# Patient Record
Sex: MALE | Race: OTHER | HISPANIC OR LATINO
[De-identification: names, ages, dates, MRNs, and addresses within clinical notes are randomized per-mention and may not be internally consistent; named-entity substitution may affect disease eponyms.]

---

## 2021-01-20 PROBLEM — Z00.00 ENCOUNTER FOR PREVENTIVE HEALTH EXAMINATION: Status: ACTIVE | Noted: 2021-01-20

## 2021-01-21 ENCOUNTER — APPOINTMENT (OUTPATIENT)
Dept: SURGERY | Facility: CLINIC | Age: 51
End: 2021-01-21
Payer: MEDICAID

## 2021-01-21 VITALS
WEIGHT: 206 LBS | HEART RATE: 72 BPM | SYSTOLIC BLOOD PRESSURE: 134 MMHG | BODY MASS INDEX: 31.22 KG/M2 | HEIGHT: 68 IN | DIASTOLIC BLOOD PRESSURE: 84 MMHG | TEMPERATURE: 98.1 F

## 2021-01-21 DIAGNOSIS — Z78.9 OTHER SPECIFIED HEALTH STATUS: ICD-10-CM

## 2021-01-21 DIAGNOSIS — M25.519 PAIN IN UNSPECIFIED SHOULDER: ICD-10-CM

## 2021-01-21 DIAGNOSIS — Z83.3 FAMILY HISTORY OF DIABETES MELLITUS: ICD-10-CM

## 2021-01-21 DIAGNOSIS — I10 ESSENTIAL (PRIMARY) HYPERTENSION: ICD-10-CM

## 2021-01-21 DIAGNOSIS — F32.9 MAJOR DEPRESSIVE DISORDER, SINGLE EPISODE, UNSPECIFIED: ICD-10-CM

## 2021-01-21 DIAGNOSIS — Z56.0 UNEMPLOYMENT, UNSPECIFIED: ICD-10-CM

## 2021-01-21 PROCEDURE — 99203 OFFICE O/P NEW LOW 30 MIN: CPT

## 2021-01-21 PROCEDURE — 99072 ADDL SUPL MATRL&STAF TM PHE: CPT

## 2021-01-21 RX ORDER — SERTRALINE HYDROCHLORIDE 100 MG/1
100 TABLET, FILM COATED ORAL
Refills: 0 | Status: ACTIVE | COMMUNITY

## 2021-01-21 RX ORDER — NEBIVOLOL HYDROCHLORIDE 10 MG/1
10 TABLET ORAL
Refills: 0 | Status: ACTIVE | COMMUNITY

## 2021-01-21 RX ORDER — LOSARTAN POTASSIUM 100 MG/1
TABLET, FILM COATED ORAL
Refills: 0 | Status: ACTIVE | COMMUNITY

## 2021-01-21 RX ORDER — NAPROXEN 500 MG/1
500 TABLET ORAL
Refills: 0 | Status: ACTIVE | COMMUNITY

## 2021-01-21 SDOH — ECONOMIC STABILITY - INCOME SECURITY: UNEMPLOYMENT, UNSPECIFIED: Z56.0

## 2021-01-21 NOTE — PLAN
[FreeTextEntry1] : Mr. AZEVEDO  was told significance of findings, options, risks and benefits were explained.  Informed consent for laparoscopic/possible open  incisional hernia repair and potential risks, benefits and alternatives (surgical options were discussed including non-surgical options or the option of no surgery) to the planned surgery were discussed in depth.  All surgical options were discussed including non-surgical treatments.  He wishes to proceed with surgery.  We will plan for surgery on at the next available date, pending any required insurance pre-certification or pre-approval. He agrees to obtain any necessary pre-operative evaluations and testing prior to surgery.\par Patient advised to seek immediate medical attention with any acute change in symptoms or with the development of any new or worsening symptoms.  Patient's questions and concerns addressed to patient's satisfaction, and patient verbalized an understanding of the information discussed.\par \par

## 2021-01-21 NOTE — CONSULT LETTER
[Dear  ___] : Dear  [unfilled], [Consult Letter:] : I had the pleasure of evaluating your patient, [unfilled]. [Please see my note below.] : Please see my note below. [Consult Closing:] : Thank you very much for allowing me to participate in the care of this patient.  If you have any questions, please do not hesitate to contact me. [Sincerely,] : Sincerely, [FreeTextEntry3] : Sal Desai MD, FACS

## 2021-01-21 NOTE — PHYSICAL EXAM
[Alert] : alert [Oriented to Person] : oriented to person [Oriented to Place] : oriented to place [Oriented to Time] : oriented to time [Calm] : calm [de-identified] : He  is alert, well-groomed, and cheerful.\par   [de-identified] : anicteric.  Nasal mucosa pink, septum midline. Oral mucosa pink.  Tongue midline, Pharynx without exudates.\par   [de-identified] : reducible  incisional hernia, mildly tender.  The defect appears to be relatively small  and the skin overlying the hernia is normal. scar on the chest and above umbilicus  [de-identified] : Neck supple. Trachea midline. Thyroid isthmus barely palpable, lobes not felt.\par

## 2021-01-28 DIAGNOSIS — Z01.818 ENCOUNTER FOR OTHER PREPROCEDURAL EXAMINATION: ICD-10-CM

## 2021-02-02 ENCOUNTER — APPOINTMENT (OUTPATIENT)
Dept: DISASTER EMERGENCY | Facility: CLINIC | Age: 51
End: 2021-02-02

## 2021-02-02 ENCOUNTER — RESULT REVIEW (OUTPATIENT)
Age: 51
End: 2021-02-02

## 2021-02-02 ENCOUNTER — OUTPATIENT (OUTPATIENT)
Dept: OUTPATIENT SERVICES | Facility: HOSPITAL | Age: 51
LOS: 1 days | End: 2021-02-02
Payer: MEDICAID

## 2021-02-02 VITALS
SYSTOLIC BLOOD PRESSURE: 113 MMHG | DIASTOLIC BLOOD PRESSURE: 78 MMHG | OXYGEN SATURATION: 100 % | TEMPERATURE: 98 F | WEIGHT: 201.06 LBS | RESPIRATION RATE: 16 BRPM | HEART RATE: 60 BPM | HEIGHT: 68 IN

## 2021-02-02 DIAGNOSIS — Z01.818 ENCOUNTER FOR OTHER PREPROCEDURAL EXAMINATION: ICD-10-CM

## 2021-02-02 DIAGNOSIS — K43.2 INCISIONAL HERNIA WITHOUT OBSTRUCTION OR GANGRENE: ICD-10-CM

## 2021-02-02 DIAGNOSIS — I10 ESSENTIAL (PRIMARY) HYPERTENSION: ICD-10-CM

## 2021-02-02 DIAGNOSIS — Z98.890 OTHER SPECIFIED POSTPROCEDURAL STATES: Chronic | ICD-10-CM

## 2021-02-02 DIAGNOSIS — E78.5 HYPERLIPIDEMIA, UNSPECIFIED: ICD-10-CM

## 2021-02-02 DIAGNOSIS — F32.9 MAJOR DEPRESSIVE DISORDER, SINGLE EPISODE, UNSPECIFIED: ICD-10-CM

## 2021-02-02 DIAGNOSIS — Z29.9 ENCOUNTER FOR PROPHYLACTIC MEASURES, UNSPECIFIED: ICD-10-CM

## 2021-02-02 LAB — BLD GP AB SCN SERPL QL: SIGNIFICANT CHANGE UP

## 2021-02-02 PROCEDURE — G0463: CPT

## 2021-02-02 PROCEDURE — 71046 X-RAY EXAM CHEST 2 VIEWS: CPT

## 2021-02-02 PROCEDURE — 71046 X-RAY EXAM CHEST 2 VIEWS: CPT | Mod: 26

## 2021-02-02 NOTE — H&P PST ADULT - NSANTHOSAYNRD_GEN_A_CORE
No. BETZY screening performed.  STOP BANG Legend: 0-2 = LOW Risk; 3-4 = INTERMEDIATE Risk; 5-8 = HIGH Risk

## 2021-02-02 NOTE — H&P PST ADULT - BREASTS
Take calcium with Vit D 1 tablet twice a day. Don't pick at the place on your arm. Patient should call the office immediately with new or ongoing signs or symptoms or worsening, or proceed to the emergency room. If you are on medications which could impair your senses, you are at risk of weakness, falls, dizziness, or drowsiness. You should be careful during activities which could place you at risk of harm, such as climbing, using stairs, operating machinery, or driving vehicles. If you feel you cannot safely do these activities, you should request others to help you, or avoid the activities altogether. If you are drowsy for any other reason, you should use the same precautions as listed above.
detailed exam

## 2021-02-02 NOTE — H&P PST ADULT - NSICDXPASTMEDICALHX_GEN_ALL_CORE_FT
PAST MEDICAL HISTORY:  Depression pt on meds    Dry eyes bilaterally    History of aortic dissection 5/2/2019- sx done pt under Cardiology care    History of umbilical hernia 2006    HLD (hyperlipidemia)     Hypertension     Inguinal hernia right 1977 and left 2007-hx- sx done    Left shoulder pain on and off 2 months     PAST MEDICAL HISTORY:  Depression pt on meds    Dry eyes bilaterally    History of aortic dissection 5/2/2019- sx done pt under Cardiology care- pt on ASA 81 mg, stopped 1/27/2021 as per Cardiology    History of umbilical hernia 2006    HLD (hyperlipidemia)     Hypertension     Inguinal hernia right 1977 and left 2007-hx- sx done    Left shoulder pain on and off 2 months

## 2021-02-02 NOTE — H&P PST ADULT - NSICDXPASTSURGICALHX_GEN_ALL_CORE_FT
PAST SURGICAL HISTORY:  H/O umbilical hernia repair 2006    S/P aortic dissection repair 5/2/2019    S/P inguinal hernia repair left 2007, right 1977

## 2021-02-02 NOTE — H&P PST ADULT - GIT GEN HX ROS MEA POS PC
hx of right and left inguinal hernia and repair , hx of umbilical hernia and repair/abdominal pain hx of right and left inguinal hernia and repair , hx of umbilical hernia and repair of hernia/abdominal pain

## 2021-02-02 NOTE — H&P PST ADULT - HISTORY OF PRESENT ILLNESS
50 years old male presented to [facility] for evaluation before surgery, patient was diagnosed with incisional hernia without obstruction or gangrene and is scheduled for laparoscopic incisional hernia repair possible open on 2/5/2021.

## 2021-02-02 NOTE — H&P PST ADULT - GASTROINTESTINAL COMMENTS
slight abdominal discomfort above umbilicus , swelling - incisional hernia supraumbilical mass, 6 cm x 6 cm with scar inside , slight tender to palpation

## 2021-02-02 NOTE — H&P PST ADULT - NEGATIVE GENERAL GENITOURINARY SYMPTOMS
no hematuria/no renal colic/no flank pain L/no incontinence/no dysuria/no urinary hesitancy/normal urinary frequency/no nocturia

## 2021-02-02 NOTE — H&P PST ADULT - MUSCULOSKELETAL
no joint swelling/no joint erythema/no joint warmth/no calf tenderness/decreased ROM due to pain detailed exam details…

## 2021-02-02 NOTE — H&P PST ADULT - NSICDXFAMILYHX_GEN_ALL_CORE_FT
Not sure if I got back to family re:CT neck-it was neg-I will check her at next [facility] in Feb.
FAMILY HISTORY:  FH: type 2 diabetes, mother- alive

## 2021-02-03 LAB — SARS-COV-2 N GENE NPH QL NAA+PROBE: NOT DETECTED

## 2021-02-04 ENCOUNTER — TRANSCRIPTION ENCOUNTER (OUTPATIENT)
Age: 51
End: 2021-02-04

## 2021-02-05 ENCOUNTER — OUTPATIENT (OUTPATIENT)
Dept: OUTPATIENT SERVICES | Facility: HOSPITAL | Age: 51
LOS: 1 days | End: 2021-02-05
Payer: MEDICAID

## 2021-02-05 VITALS
RESPIRATION RATE: 16 BRPM | DIASTOLIC BLOOD PRESSURE: 73 MMHG | OXYGEN SATURATION: 97 % | TEMPERATURE: 97 F | HEART RATE: 78 BPM | SYSTOLIC BLOOD PRESSURE: 131 MMHG

## 2021-02-05 VITALS
DIASTOLIC BLOOD PRESSURE: 73 MMHG | OXYGEN SATURATION: 98 % | WEIGHT: 201.06 LBS | RESPIRATION RATE: 16 BRPM | SYSTOLIC BLOOD PRESSURE: 118 MMHG | HEART RATE: 78 BPM | TEMPERATURE: 99 F | HEIGHT: 68 IN

## 2021-02-05 DIAGNOSIS — Z98.890 OTHER SPECIFIED POSTPROCEDURAL STATES: Chronic | ICD-10-CM

## 2021-02-05 DIAGNOSIS — Z01.818 ENCOUNTER FOR OTHER PREPROCEDURAL EXAMINATION: ICD-10-CM

## 2021-02-05 DIAGNOSIS — K43.2 INCISIONAL HERNIA WITHOUT OBSTRUCTION OR GANGRENE: ICD-10-CM

## 2021-02-05 LAB — BLD GP AB SCN SERPL QL: SIGNIFICANT CHANGE UP

## 2021-02-05 PROCEDURE — C1781: CPT

## 2021-02-05 PROCEDURE — 49654: CPT

## 2021-02-05 PROCEDURE — 86850 RBC ANTIBODY SCREEN: CPT

## 2021-02-05 PROCEDURE — 36415 COLL VENOUS BLD VENIPUNCTURE: CPT

## 2021-02-05 PROCEDURE — 86900 BLOOD TYPING SEROLOGIC ABO: CPT

## 2021-02-05 PROCEDURE — 49654: CPT | Mod: AS

## 2021-02-05 PROCEDURE — C1889: CPT

## 2021-02-05 PROCEDURE — 86901 BLOOD TYPING SEROLOGIC RH(D): CPT

## 2021-02-05 PROCEDURE — 49657: CPT

## 2021-02-05 RX ORDER — NEBIVOLOL HYDROCHLORIDE 5 MG/1
1 TABLET ORAL
Qty: 0 | Refills: 0 | DISCHARGE

## 2021-02-05 RX ORDER — HYDROCHLOROTHIAZIDE 25 MG
12.5 TABLET ORAL ONCE
Refills: 0 | Status: DISCONTINUED | OUTPATIENT
Start: 2021-02-05 | End: 2021-02-12

## 2021-02-05 RX ORDER — CHOLECALCIFEROL (VITAMIN D3) 125 MCG
1 CAPSULE ORAL
Qty: 0 | Refills: 0 | DISCHARGE

## 2021-02-05 RX ORDER — SODIUM CHLORIDE 9 MG/ML
1000 INJECTION, SOLUTION INTRAVENOUS
Refills: 0 | Status: DISCONTINUED | OUTPATIENT
Start: 2021-02-05 | End: 2021-02-05

## 2021-02-05 RX ORDER — LOSARTAN POTASSIUM 100 MG/1
1 TABLET, FILM COATED ORAL
Qty: 0 | Refills: 0 | DISCHARGE

## 2021-02-05 RX ORDER — ZINC GLUCONATE 30 MG
1 TABLET ORAL
Qty: 0 | Refills: 0 | DISCHARGE

## 2021-02-05 RX ORDER — SODIUM CHLORIDE 9 MG/ML
3 INJECTION INTRAMUSCULAR; INTRAVENOUS; SUBCUTANEOUS EVERY 8 HOURS
Refills: 0 | Status: DISCONTINUED | OUTPATIENT
Start: 2021-02-05 | End: 2021-02-05

## 2021-02-05 RX ORDER — OMEGA-3 ACID ETHYL ESTERS 1 G
1 CAPSULE ORAL
Qty: 0 | Refills: 0 | DISCHARGE

## 2021-02-05 RX ORDER — KETOROLAC TROMETHAMINE 30 MG/ML
15 SYRINGE (ML) INJECTION ONCE
Refills: 0 | Status: DISCONTINUED | OUTPATIENT
Start: 2021-02-05 | End: 2021-02-05

## 2021-02-05 RX ORDER — ASPIRIN/CALCIUM CARB/MAGNESIUM 324 MG
1 TABLET ORAL
Qty: 0 | Refills: 0 | DISCHARGE

## 2021-02-05 RX ORDER — ESCITALOPRAM OXALATE 10 MG/1
1 TABLET, FILM COATED ORAL
Qty: 0 | Refills: 0 | DISCHARGE

## 2021-02-05 RX ORDER — MULTIVIT-MIN/FERROUS GLUCONATE 9 MG/15 ML
1 LIQUID (ML) ORAL
Qty: 0 | Refills: 0 | DISCHARGE

## 2021-02-05 RX ORDER — HYDROMORPHONE HYDROCHLORIDE 2 MG/ML
0.5 INJECTION INTRAMUSCULAR; INTRAVENOUS; SUBCUTANEOUS
Refills: 0 | Status: DISCONTINUED | OUTPATIENT
Start: 2021-02-05 | End: 2021-02-05

## 2021-02-05 RX ORDER — HYDROMORPHONE HYDROCHLORIDE 2 MG/ML
1 INJECTION INTRAMUSCULAR; INTRAVENOUS; SUBCUTANEOUS
Refills: 0 | Status: DISCONTINUED | OUTPATIENT
Start: 2021-02-05 | End: 2021-02-05

## 2021-02-05 RX ADMIN — Medication 15 MILLIGRAM(S): at 16:50

## 2021-02-05 NOTE — BRIEF OPERATIVE NOTE - OPERATION/FINDINGS
fat containing incisional hernia  defects repaired with ventralight mesh, secured with prolene and securestrap

## 2021-02-05 NOTE — ASU DISCHARGE PLAN (ADULT/PEDIATRIC) - CARE PROVIDER_API CALL
Sal Desai)  Surgery  [address] [facility], [place] [address]  Phone: [phone]  Fax: [phone]  Follow Up Time: 2 weeks

## 2021-02-05 NOTE — ASU PATIENT PROFILE, ADULT - PMH
Depression  pt on meds  Dry eyes  bilaterally  History of aortic dissection  5/2/2019- sx done pt under Cardiology care- pt on ASA 81 mg, stopped 1/27/2021 as per Cardiology  History of umbilical hernia  2006  HLD (hyperlipidemia)    Hypertension    Inguinal hernia  right 1977 and left 2007-hx- sx done  Left shoulder pain  on and off 2 months

## 2021-02-05 NOTE — ASU PATIENT PROFILE, ADULT - PSH
H/O umbilical hernia repair  2006  S/P aortic dissection repair  5/2/2019  S/P inguinal hernia repair  left 2007, right 1977

## 2021-02-05 NOTE — BRIEF OPERATIVE NOTE - NSICDXBRIEFPROCEDURE_GEN_ALL_CORE_FT
PROCEDURES:  Laparoscopic repair of incarcerated ventral hernia 05-Feb-2021 15:01:14  Shonda Gomez

## 2021-02-11 PROBLEM — Z86.79 PERSONAL HISTORY OF OTHER DISEASES OF THE CIRCULATORY SYSTEM: Chronic | Status: ACTIVE | Noted: 2021-02-02

## 2021-02-11 PROBLEM — I10 ESSENTIAL (PRIMARY) HYPERTENSION: Chronic | Status: ACTIVE | Noted: 2021-02-02

## 2021-02-11 PROBLEM — F32.9 MAJOR DEPRESSIVE DISORDER, SINGLE EPISODE, UNSPECIFIED: Chronic | Status: ACTIVE | Noted: 2021-02-02

## 2021-02-11 PROBLEM — K40.90 UNILATERAL INGUINAL HERNIA, WITHOUT OBSTRUCTION OR GANGRENE, NOT SPECIFIED AS RECURRENT: Chronic | Status: ACTIVE | Noted: 2021-02-02

## 2021-02-11 PROBLEM — M25.512 PAIN IN LEFT SHOULDER: Chronic | Status: ACTIVE | Noted: 2021-02-02

## 2021-02-11 PROBLEM — H04.123 DRY EYE SYNDROME OF BILATERAL LACRIMAL GLANDS: Chronic | Status: ACTIVE | Noted: 2021-02-02

## 2021-02-11 PROBLEM — E78.5 HYPERLIPIDEMIA, UNSPECIFIED: Chronic | Status: ACTIVE | Noted: 2021-02-02

## 2021-02-11 PROBLEM — Z87.19 PERSONAL HISTORY OF OTHER DISEASES OF THE DIGESTIVE SYSTEM: Chronic | Status: ACTIVE | Noted: 2021-02-02

## 2021-02-18 ENCOUNTER — APPOINTMENT (OUTPATIENT)
Dept: SURGERY | Facility: CLINIC | Age: 51
End: 2021-02-18
Payer: MEDICAID

## 2021-02-25 ENCOUNTER — APPOINTMENT (OUTPATIENT)
Dept: SURGERY | Facility: CLINIC | Age: 51
End: 2021-02-25
Payer: MEDICAID

## 2021-02-25 PROCEDURE — 99024 POSTOP FOLLOW-UP VISIT: CPT

## 2021-02-25 NOTE — PHYSICAL EXAM
[Alert] : alert [Oriented to Person] : oriented to person [Oriented to Place] : oriented to place [Oriented to Time] : oriented to time [Calm] : calm [de-identified] : He  is alert, well-groomed, and in NAD\par   [de-identified] : Surgical wounds are  healing well.   no signs of  inflammation or infection. no recurrence

## 2021-02-25 NOTE — ASSESSMENT
[FreeTextEntry1] : Mr. AZEVEDO is doing well, with excellent post-operative recovery. All surgical incisions are healing well and as expected. There is no evidence of infection or complication, and he is progressing as expected. Post-operative wound care, activity, restrictions and precautions reinforced. Patient instructed to refrain from any heavy lifting greater than 10-15 pounds for at least 4-6 weeks post-operatively. Patient's questions and concerns addressed to patient's satisfaction.\par

## 2021-02-25 NOTE — HISTORY OF PRESENT ILLNESS
[de-identified] : Mr. AZEVEDO  is s/p   Laparoscopic repair of incarcerated ventral hernia     on  02/05/2021.  Today Mr. AZEVEDO offers no complaints. patient reports no fever, chills,  or  pain. His  surgical wounds are  healing well. No signs of inflammation, infection or exudate. Patient reports good bowel movements and appetite.

## 2021-05-27 ENCOUNTER — APPOINTMENT (OUTPATIENT)
Dept: SURGERY | Facility: CLINIC | Age: 51
End: 2021-05-27

## 2021-06-01 PROBLEM — K43.9 VENTRAL HERNIA WITHOUT OBSTRUCTION OR GANGRENE: Status: ACTIVE | Noted: 2021-01-21

## 2021-06-07 ENCOUNTER — APPOINTMENT (OUTPATIENT)
Dept: SURGERY | Facility: CLINIC | Age: 51
End: 2021-06-07
Payer: MEDICAID

## 2021-06-07 VITALS
SYSTOLIC BLOOD PRESSURE: 112 MMHG | HEIGHT: 68 IN | HEART RATE: 77 BPM | WEIGHT: 215 LBS | OXYGEN SATURATION: 99 % | BODY MASS INDEX: 32.58 KG/M2 | DIASTOLIC BLOOD PRESSURE: 70 MMHG

## 2021-06-07 VITALS — TEMPERATURE: 97.1 F

## 2021-06-07 DIAGNOSIS — K43.9 VENTRAL HERNIA W/OUT OBSTRUCTION OR GANGRENE: ICD-10-CM

## 2021-06-07 PROCEDURE — 99213 OFFICE O/P EST LOW 20 MIN: CPT

## 2021-06-07 NOTE — PHYSICAL EXAM
[Alert] : alert [Oriented to Person] : oriented to person [Oriented to Place] : oriented to place [Oriented to Time] : oriented to time [Calm] : calm [de-identified] : He  is alert, well-groomed, and in NAD\par   [de-identified] : anicteric.  Nasal mucosa pink, septum midline. Oral mucosa pink.  Tongue midline, Pharynx without exudates.\par   [de-identified] : Neck supple. Trachea midline. Thyroid isthmus barely palpable, lobes not felt.\par   [de-identified] : no recurrence

## 2021-06-07 NOTE — PLAN
[FreeTextEntry1] : Mr. AZEVEDO will follow up  if needed. Warning signs, follow up, and restrictions were discussed with the patient.

## 2021-06-07 NOTE — HISTORY OF PRESENT ILLNESS
[de-identified] : Mr. AZEVEDO  is s/p   Laparoscopic repair of incarcerated ventral hernia     on  02/05/2021.  Today  Mr. AZEVEDO offers no complaints. patient reports no fever or  chills. patient reports occasional discomfort in the surgical area.  His surgical incisions are healing well. No signs of inflammation, infection or exudate. patient reports good bowel movements and appetite.

## 2021-06-07 NOTE — ASSESSMENT
[FreeTextEntry1] : Mr. AZEVEDO is doing well, with excellent post-operative recovery. All surgical incisions healed   well and as expected. There is no evidence of   complication, and he is progressing as expected.   Patient's questions and concerns addressed to patient's satisfaction.\par

## 2022-11-28 NOTE — ASU DISCHARGE PLAN (ADULT/PEDIATRIC) - PHYSICIAN SECTION COMPLETE
11/28/2022       RE: Dion Thomson  [address] [facility] [address]     Dear Colleague,    Thank you for referring your patient, Dion Thomson, to the [facility] [place] at [facility]. Please see a copy of my visit note below.    Dion is back to see us.  He continues to have some congestion.  There is some crusting still on the right side.  At this point, plan on Bactroban for a month and then he will let me know how he is doing.  For the most part, it is better, but still not quite as good as he would like.    Fifty minutes spent on exam, chart review, and documentation on the date of the visit.          Again, thank you for allowing me to participate in the care of your patient.      Sincerely,    Willi Garcia MD      
Yes

## 2023-12-18 NOTE — H&P PST ADULT - NSICDXPROBLEM_GEN_ALL_CORE_FT
Pt ambulatory around hallway without difficulty or assistance.   
PROBLEM DIAGNOSES  Problem: Incisional hernia without obstruction or gangrene  Assessment and Plan: 50 years old male presented to [facility] for evaluation before surgery, patient was diagnosed with incisional hernia without obstruction or gangrene and is scheduled for laparoscopic incisional hernia repair possible open on 2/5/2021.     Problem: HLD (hyperlipidemia)  Assessment and Plan: Instructed pt to continue fish oil and omega 3. Follow-up with PCP postop for lipid management      Problem: Hypertension  Assessment and Plan: Continue antihypertensive meds night before surgery as always   Follow-up with PCP and Cardiology postop for management.      Problem: Prophylactic measure  Assessment and Plan: Preoperative instructions discussed with pt and given to pt. Instructed pt that no one will be allowed to come to hospital with patient , to notify security on arrival to the lobby of the hospital that today is day of surgery. Patient notified that will need someone to come to the hospital to  after surgery, not to eat or drink anything after midnight the night before the surgery, to avoid NSAIDs such as Ibuprofen, Motrin, Aleve, Advil, naproxen before surgery, to take Tylenol if needed for pain, to report exposure to anyone with any contagious diseases including Covid-19 or if patient is exhibiting any symptoms of COVID-19. Chlorhexidine 4% soap given to pt. Instructed about use of Chlorhexidine 4% soap before surgery. Patient verbalized understanding of instructions given.    Problem: Depression  Assessment and Plan: Take Escitalopram as always and  night before surgery and f/u with PCP

## 2024-12-03 NOTE — H&P PST ADULT - NS PRO FEM  PAP SMEARS 3YRS
EEG     Procedure Performed: Outpatient EEG    Interpreting Physician:  Dr. Anderson    Patient Room:  EEG Room     Measurements:  10-20 System    Supplies: [employer] -  [employer]        Helmet provided to patient No   not applicable (Male)
